# Patient Record
Sex: MALE | URBAN - METROPOLITAN AREA
[De-identification: names, ages, dates, MRNs, and addresses within clinical notes are randomized per-mention and may not be internally consistent; named-entity substitution may affect disease eponyms.]

---

## 2018-09-27 ENCOUNTER — APPOINTMENT (RX ONLY)
Dept: URBAN - METROPOLITAN AREA CLINIC 151 | Facility: CLINIC | Age: 78
Setting detail: DERMATOLOGY
End: 2018-09-27

## 2018-09-27 DIAGNOSIS — L28.0 LICHEN SIMPLEX CHRONICUS: ICD-10-CM

## 2018-09-27 DIAGNOSIS — L82.1 OTHER SEBORRHEIC KERATOSIS: ICD-10-CM

## 2018-09-27 PROCEDURE — ? COUNSELING

## 2018-09-27 PROCEDURE — ? PRESCRIPTION

## 2018-09-27 PROCEDURE — 99213 OFFICE O/P EST LOW 20 MIN: CPT

## 2018-09-27 PROCEDURE — ? PRESCRIPTION MEDICATION MANAGEMENT

## 2018-09-27 PROCEDURE — ? DIAGNOSIS COMMENT

## 2018-09-27 RX ORDER — TACROLIMUS 1 MG/G
OINTMENT TOPICAL
Qty: 1 | Refills: 3 | Status: ERX | COMMUNITY
Start: 2018-09-27

## 2018-09-27 RX ORDER — HYDROXYZINE HYDROCHLORIDE 10 MG/1
TABLET, FILM COATED ORAL
Qty: 180 | Refills: 3 | Status: ERX | COMMUNITY
Start: 2018-09-27

## 2018-09-27 RX ADMIN — HYDROXYZINE HYDROCHLORIDE: 10 TABLET, FILM COATED ORAL at 12:46

## 2018-09-27 RX ADMIN — TACROLIMUS: 1 OINTMENT TOPICAL at 12:44

## 2018-09-27 ASSESSMENT — LOCATION DETAILED DESCRIPTION DERM
LOCATION DETAILED: PERIUMBILICAL SKIN
LOCATION DETAILED: RIGHT ANTERIOR DISTAL UPPER ARM
LOCATION DETAILED: GENITALIA
LOCATION DETAILED: SUPERIOR THORACIC SPINE
LOCATION DETAILED: LEFT ANTERIOR DISTAL UPPER ARM
LOCATION DETAILED: LEFT ANTERIOR PROXIMAL THIGH

## 2018-09-27 ASSESSMENT — LOCATION SIMPLE DESCRIPTION DERM
LOCATION SIMPLE: UPPER BACK
LOCATION SIMPLE: RIGHT UPPER ARM
LOCATION SIMPLE: LEFT THIGH
LOCATION SIMPLE: ABDOMEN
LOCATION SIMPLE: GENITALIA
LOCATION SIMPLE: LEFT UPPER ARM

## 2018-09-27 ASSESSMENT — LOCATION ZONE DERM
LOCATION ZONE: GENITALIA
LOCATION ZONE: LEG
LOCATION ZONE: ARM
LOCATION ZONE: TRUNK

## 2018-09-27 NOTE — PROCEDURE: PRESCRIPTION MEDICATION MANAGEMENT
Otc Regimen: Sarna anti-itch lotion for itching as needed
Detail Level: Zone
Continue Regimen: hydroxyzine 1-2 tabs nightly as needed for itching
Initiate Treatment: tacrolimus 0.1% ointment twice daily on body, face, groin as needed
Discontinue Regimen: triamcinolone 0.1% cream to the groin

## 2018-09-27 NOTE — HPI: ITCHING
How Did Your Itching Occur?: gradual in onset  (over a period of years)
Additional History: He thinks the hydroxyzine 10mg and TAC 0.1% cream are helpful. CeraVe itch relief lotion is not helpful. 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

## 2021-09-10 ENCOUNTER — PREPPED CHART (OUTPATIENT)
Dept: URBAN - METROPOLITAN AREA CLINIC 33 | Facility: CLINIC | Age: 81
End: 2021-09-10

## 2021-09-10 PROBLEM — H43.813 POSTERIOR VITREOUS DETACHMENT: Noted: 2021-09-10

## 2021-09-10 PROBLEM — H35.3130 DRY AMD: Noted: 2021-09-10

## 2021-09-10 PROBLEM — H35.3132 DRY AMD, INTERMEDIATE DRY STAGE: Noted: 2021-09-10

## 2021-09-10 PROBLEM — H35.373 EPIRETINAL MEMBRANE: Noted: 2021-09-10

## 2021-09-10 PROBLEM — H35.372 EPIRETINAL MEMBRANE: Noted: 2021-09-10

## 2021-09-10 PROBLEM — E11.39 DIABETES MELLITUS TYPE 2 WITH OTHER DIABETIC OPHTHALMIC COMPLICATION: Noted: 2021-09-10

## 2022-03-07 ASSESSMENT — TONOMETRY
OD_IOP_MMHG: 14
OS_IOP_MMHG: 16

## 2022-03-07 ASSESSMENT — VISUAL ACUITY
OD_SC: 20/40+2
OS_SC: 20/70-1

## 2022-03-11 ENCOUNTER — COMPLETE EYE EXAM (OUTPATIENT)
Dept: URBAN - METROPOLITAN AREA CLINIC 33 | Facility: CLINIC | Age: 82
End: 2022-03-11

## 2022-03-11 DIAGNOSIS — H35.3132: ICD-10-CM

## 2022-03-11 DIAGNOSIS — H43.813: ICD-10-CM

## 2022-03-11 DIAGNOSIS — H35.373: ICD-10-CM

## 2022-03-11 DIAGNOSIS — E11.9: ICD-10-CM

## 2022-03-11 PROCEDURE — 92201 OPSCPY EXTND RTA DRAW UNI/BI: CPT

## 2022-03-11 PROCEDURE — 92134 CPTRZ OPH DX IMG PST SGM RTA: CPT

## 2022-03-11 PROCEDURE — 92014 COMPRE OPH EXAM EST PT 1/>: CPT

## 2022-03-11 ASSESSMENT — TONOMETRY
OS_IOP_MMHG: 14
OD_IOP_MMHG: 14

## 2022-03-11 ASSESSMENT — VISUAL ACUITY
OD_SC: 20/50-1
OS_SC: 20/50-1

## 2022-09-20 ENCOUNTER — COMPLETE EYE EXAM (OUTPATIENT)
Dept: URBAN - METROPOLITAN AREA CLINIC 33 | Facility: CLINIC | Age: 82
End: 2022-09-20

## 2022-09-20 DIAGNOSIS — H43.813: ICD-10-CM

## 2022-09-20 DIAGNOSIS — H35.373: ICD-10-CM

## 2022-09-20 DIAGNOSIS — H35.3132: ICD-10-CM

## 2022-09-20 DIAGNOSIS — E11.9: ICD-10-CM

## 2022-09-20 PROCEDURE — 92202 OPSCPY EXTND ON/MAC DRAW: CPT

## 2022-09-20 PROCEDURE — 92134 CPTRZ OPH DX IMG PST SGM RTA: CPT

## 2022-09-20 PROCEDURE — 92014 COMPRE OPH EXAM EST PT 1/>: CPT

## 2022-09-20 ASSESSMENT — TONOMETRY
OD_IOP_MMHG: 13
OS_IOP_MMHG: 13

## 2022-09-20 ASSESSMENT — VISUAL ACUITY
OS_PH: 20/30-2
OD_SC: 20/50-2
OS_SC: 20/70-2

## 2023-04-11 ENCOUNTER — COMPLETE EYE EXAM (OUTPATIENT)
Dept: URBAN - METROPOLITAN AREA CLINIC 33 | Facility: CLINIC | Age: 83
End: 2023-04-11

## 2023-04-11 DIAGNOSIS — H43.813: ICD-10-CM

## 2023-04-11 DIAGNOSIS — E11.9: ICD-10-CM

## 2023-04-11 DIAGNOSIS — H35.3132: ICD-10-CM

## 2023-04-11 DIAGNOSIS — H35.373: ICD-10-CM

## 2023-04-11 PROCEDURE — 92014 COMPRE OPH EXAM EST PT 1/>: CPT

## 2023-04-11 PROCEDURE — 92202 OPSCPY EXTND ON/MAC DRAW: CPT

## 2023-04-11 ASSESSMENT — TONOMETRY
OS_IOP_MMHG: 17
OD_IOP_MMHG: 15

## 2023-04-11 ASSESSMENT — VISUAL ACUITY
OS_PH: 20/40-2
OS_SC: 20/70-2
OD_SC: 20/40-2

## 2023-10-26 ENCOUNTER — FOLLOW UP (OUTPATIENT)
Dept: URBAN - METROPOLITAN AREA CLINIC 101 | Facility: CLINIC | Age: 83
End: 2023-10-26

## 2023-10-26 DIAGNOSIS — H35.3132: ICD-10-CM

## 2023-10-26 DIAGNOSIS — H35.373: ICD-10-CM

## 2023-10-26 DIAGNOSIS — H43.813: ICD-10-CM

## 2023-10-26 DIAGNOSIS — E11.9: ICD-10-CM

## 2023-10-26 PROCEDURE — 92134 CPTRZ OPH DX IMG PST SGM RTA: CPT

## 2023-10-26 PROCEDURE — 92202 OPSCPY EXTND ON/MAC DRAW: CPT

## 2023-10-26 PROCEDURE — 92014 COMPRE OPH EXAM EST PT 1/>: CPT

## 2023-10-26 ASSESSMENT — VISUAL ACUITY
OD_SC: 20/40
OD_PH: 20/20-1
OS_SC: 20/60
OS_PH: 20/25

## 2023-10-26 ASSESSMENT — TONOMETRY
OD_IOP_MMHG: 14
OS_IOP_MMHG: 16

## 2024-05-24 ENCOUNTER — FOLLOW UP (OUTPATIENT)
Dept: URBAN - METROPOLITAN AREA CLINIC 33 | Facility: CLINIC | Age: 84
End: 2024-05-24

## 2024-05-24 DIAGNOSIS — E11.9: ICD-10-CM

## 2024-05-24 DIAGNOSIS — H35.3132: ICD-10-CM

## 2024-05-24 DIAGNOSIS — H35.373: ICD-10-CM

## 2024-05-24 DIAGNOSIS — H43.813: ICD-10-CM

## 2024-05-24 PROCEDURE — 92014 COMPRE OPH EXAM EST PT 1/>: CPT

## 2024-05-24 PROCEDURE — 92202 OPSCPY EXTND ON/MAC DRAW: CPT

## 2024-05-24 PROCEDURE — 92134 CPTRZ OPH DX IMG PST SGM RTA: CPT

## 2024-05-24 ASSESSMENT — TONOMETRY
OD_IOP_MMHG: 19
OS_IOP_MMHG: 18

## 2024-05-24 ASSESSMENT — VISUAL ACUITY
OD_SC: 20/40+1
OS_SC: 20/60+2
OD_PH: 20/30-3
OS_PH: 20/50-3

## 2024-11-20 ENCOUNTER — 6 MONTH FOLLOW UP (OUTPATIENT)
Dept: URBAN - METROPOLITAN AREA CLINIC 101 | Facility: CLINIC | Age: 84
End: 2024-11-20

## 2024-11-20 DIAGNOSIS — H35.3132: ICD-10-CM

## 2024-11-20 DIAGNOSIS — E11.9: ICD-10-CM

## 2024-11-20 DIAGNOSIS — H43.813: ICD-10-CM

## 2024-11-20 DIAGNOSIS — H35.373: ICD-10-CM

## 2024-11-20 PROCEDURE — 92202 OPSCPY EXTND ON/MAC DRAW: CPT

## 2024-11-20 PROCEDURE — 92134 CPTRZ OPH DX IMG PST SGM RTA: CPT

## 2024-11-20 PROCEDURE — 92014 COMPRE OPH EXAM EST PT 1/>: CPT

## 2024-11-20 ASSESSMENT — TONOMETRY
OS_IOP_MMHG: 18
OD_IOP_MMHG: 18

## 2024-11-20 ASSESSMENT — VISUAL ACUITY
OS_CC: 20/60+2
OD_CC: 20/40
OD_PH: 20/25-1
OS_PH: 20/30-1

## 2025-05-07 ENCOUNTER — 6 MONTH FOLLOW UP (OUTPATIENT)
Dept: URBAN - METROPOLITAN AREA CLINIC 101 | Facility: CLINIC | Age: 85
End: 2025-05-07

## 2025-05-07 DIAGNOSIS — H35.373: ICD-10-CM

## 2025-05-07 DIAGNOSIS — E11.9: ICD-10-CM

## 2025-05-07 DIAGNOSIS — H43.813: ICD-10-CM

## 2025-05-07 DIAGNOSIS — H35.3132: ICD-10-CM

## 2025-05-07 PROCEDURE — 92014 COMPRE OPH EXAM EST PT 1/>: CPT

## 2025-05-07 PROCEDURE — 92134 CPTRZ OPH DX IMG PST SGM RTA: CPT

## 2025-05-07 PROCEDURE — 92201 OPSCPY EXTND RTA DRAW UNI/BI: CPT

## 2025-05-07 ASSESSMENT — VISUAL ACUITY
OD_PH: 20/40-2
OD_SC: 20/60+1
OS_SC: 20/30

## 2025-05-07 ASSESSMENT — TONOMETRY
OD_IOP_MMHG: 18
OS_IOP_MMHG: 19